# Patient Record
Sex: FEMALE | Race: ASIAN | NOT HISPANIC OR LATINO | URBAN - METROPOLITAN AREA
[De-identification: names, ages, dates, MRNs, and addresses within clinical notes are randomized per-mention and may not be internally consistent; named-entity substitution may affect disease eponyms.]

---

## 2024-02-27 ENCOUNTER — APPOINTMENT (RX ONLY)
Dept: URBAN - METROPOLITAN AREA CLINIC 159 | Facility: CLINIC | Age: 34
Setting detail: DERMATOLOGY
End: 2024-02-27

## 2024-02-27 DIAGNOSIS — Z41.9 ENCOUNTER FOR PROCEDURE FOR PURPOSES OTHER THAN REMEDYING HEALTH STATE, UNSPECIFIED: ICD-10-CM

## 2024-02-27 PROCEDURE — ? FACIAL

## 2024-02-27 ASSESSMENT — LOCATION ZONE DERM: LOCATION ZONE: LIP

## 2024-02-27 ASSESSMENT — LOCATION DETAILED DESCRIPTION DERM: LOCATION DETAILED: LEFT UPPER CUTANEOUS LIP

## 2024-02-27 ASSESSMENT — LOCATION SIMPLE DESCRIPTION DERM: LOCATION SIMPLE: LEFT LIP

## 2024-02-27 NOTE — PROCEDURE: FACIAL
Extraction Method: extractor
Price (Use Numbers Only, No Special Characters Or $): 125
Facial Steaming: steamed
Exfoliation Type: scrub
Treatment Type (Optional): Spa Facial
Detail Level: Zone
Treatment Type Override: back

## 2024-04-25 ENCOUNTER — APPOINTMENT (RX ONLY)
Dept: URBAN - METROPOLITAN AREA CLINIC 159 | Facility: CLINIC | Age: 34
Setting detail: DERMATOLOGY
End: 2024-04-25

## 2024-04-25 DIAGNOSIS — Z41.9 ENCOUNTER FOR PROCEDURE FOR PURPOSES OTHER THAN REMEDYING HEALTH STATE, UNSPECIFIED: ICD-10-CM

## 2024-04-25 PROCEDURE — ? ADDITIONAL NOTES

## 2024-04-25 PROCEDURE — ? DYSPORT

## 2024-04-25 ASSESSMENT — LOCATION SIMPLE DESCRIPTION DERM: LOCATION SIMPLE: LEFT CHEEK

## 2024-04-25 ASSESSMENT — LOCATION ZONE DERM: LOCATION ZONE: FACE

## 2024-04-25 ASSESSMENT — LOCATION DETAILED DESCRIPTION DERM: LOCATION DETAILED: LEFT INFERIOR CENTRAL MALAR CHEEK

## 2024-04-25 NOTE — PROCEDURE: DYSPORT
Consent: Written consent obtained. Risks include but not limited to lid/brow ptosis, bruising, swelling, diplopia, temporary effect, incomplete chemical denervation.
Periorbital Skin Units: 7.5
Show Ucl Units: No
Show Orbicularis Oculi Units: Yes
Masseter Units: 0
Post-Care Instructions: Patient instructed to not wear tight fitting hats or headbands, not to massage or manipulate injected areas, or lie down for at least 4 hours after procedure.  Limiting physical activity for 24 hours is also recommended.
Dilution (U/0.1 Cc): 5
Detail Level: Zone
Additional Area 1 Location: tail of brow
Glabellar Complex Units: 15
Additional Area 1 Units: 2.5
Additional Area 2 Location: Upper lip
Show Inventory Tab: Show

## 2024-04-25 NOTE — PROCEDURE: ADDITIONAL NOTES
Render Risk Assessment In Note?: no
Detail Level: Simple
Additional Notes: Rec filler to mid cheek region (HA filler)\\n\\nGood candidate for under eye PRP (series of 3, 4-6 wks apart), will improve discoloration and brighten.

## 2024-06-13 ENCOUNTER — APPOINTMENT (RX ONLY)
Dept: URBAN - METROPOLITAN AREA CLINIC 159 | Facility: CLINIC | Age: 34
Setting detail: DERMATOLOGY
End: 2024-06-13

## 2024-06-13 DIAGNOSIS — Z41.9 ENCOUNTER FOR PROCEDURE FOR PURPOSES OTHER THAN REMEDYING HEALTH STATE, UNSPECIFIED: ICD-10-CM

## 2024-06-13 PROCEDURE — ? PLATELET RICH PLASMA INJECTION

## 2024-06-13 ASSESSMENT — LOCATION ZONE DERM: LOCATION ZONE: EYELID

## 2024-06-13 ASSESSMENT — LOCATION DETAILED DESCRIPTION DERM
LOCATION DETAILED: LEFT LATERAL INFERIOR EYELID
LOCATION DETAILED: RIGHT LATERAL INFERIOR EYELID

## 2024-06-13 ASSESSMENT — LOCATION SIMPLE DESCRIPTION DERM
LOCATION SIMPLE: LEFT INFERIOR EYELID
LOCATION SIMPLE: RIGHT INFERIOR EYELID

## 2024-06-13 NOTE — PROCEDURE: PLATELET RICH PLASMA INJECTION
Venipuncture Paragraph: An alcohol pad was applied to the venipuncture site. Venipuncture was performed using a butterfly needle. Pressure and a bandaid was applied to the site. No complications were noted.
Who Performed The Venipuncture?: my medical assistant
Anesthesia Type: 1% lidocaine with epinephrine
Post-Care Instructions: After the procedure, take precautions against sun exposure.  Do not apply make-up for 12 hours after the procedure.  Do not swim or undertake any strenuous exercise to prevent bruising.  Use only gentle skincare products.  After 2-3 days patients can return to their regular skin regimen.
Detail Level: Simple
Amount Injected At This Location In Cc (Will Not Render If 0): 0
Amount Injected At This Location In Cc (Will Not Render If 0): 2.5
Location #1: Right lower periorbital
Consent: Written consent obtained. Risks include but not limited to procedural discomfort, bruising, swelling, infection, incomplete augmentation, and temporary nature of the procedure were discussed with the patient.  \\nPatient understands the procedure is cosmetic in nature and will require out of pocket payment.
Technique 2: PRP for hairloss:  \\n\\nPatients blood was obtained via venipuncture using aseptic technique into the PRP tube.  The tube was inverted 7 times to mix the anti-coagulant with patient blood.  Tube was placed in the centrifuge within 6 minutes of blood draw and spun for 10 minutes.  PRP tube was removed from centrifuge.  Keeping the tube vertical, a needle was inserted into the top of the tube and PRP was aspirated into a 3 ml syringe.\\n\\nPRP was injected via 27g or 30 g needle into the deep dermis and/or subcutaneous layer of the skin throughout areas of the scalp where thinning is clinically present.
Show Additional Techniques: Yes
Location #2: Left lower periorbital
Treatment Number (Optional): 1
Which Technique?: Default
Price (Use Numbers Only, No Special Characters Or $): 654
Standard Default Technique For Making Prp: PRP skin rejuvenation:\\n\\nPatients blood was obtained via venipuncture using aseptic technique into the PRP tube.  The tube was inverted 7 times to mix the anti-coagulant with patient blood.  Tube was placed in the centrifuge within 6 minutes of blood draw and spun for 10 minutes.  PRP tube was removed from centrifuge.  Keeping the tube vertical, a needle was inserted into the top of the tube and PRP was aspirated into a 3 ml syringe.\\n\\nPRP was injected into the dermal layer of the periorbital skin via 25 g 1 ½ in cannula.  2-3 ml were injected under each eye.

## 2024-07-12 ENCOUNTER — APPOINTMENT (RX ONLY)
Dept: URBAN - METROPOLITAN AREA CLINIC 159 | Facility: CLINIC | Age: 34
Setting detail: DERMATOLOGY
End: 2024-07-12

## 2024-07-12 DIAGNOSIS — Z41.9 ENCOUNTER FOR PROCEDURE FOR PURPOSES OTHER THAN REMEDYING HEALTH STATE, UNSPECIFIED: ICD-10-CM

## 2024-07-12 PROCEDURE — ? PLATELET RICH PLASMA INJECTION

## 2024-07-12 PROCEDURE — ? RECOMMENDATIONS

## 2024-07-12 ASSESSMENT — LOCATION SIMPLE DESCRIPTION DERM
LOCATION SIMPLE: LEFT INFERIOR EYELID
LOCATION SIMPLE: RIGHT INFERIOR EYELID

## 2024-07-12 ASSESSMENT — LOCATION DETAILED DESCRIPTION DERM
LOCATION DETAILED: LEFT LATERAL INFERIOR EYELID
LOCATION DETAILED: RIGHT LATERAL INFERIOR EYELID

## 2024-07-12 ASSESSMENT — LOCATION ZONE DERM: LOCATION ZONE: EYELID

## 2024-07-12 NOTE — PROCEDURE: PLATELET RICH PLASMA INJECTION
Venipuncture Paragraph: An alcohol pad was applied to the venipuncture site. Venipuncture was performed using a butterfly needle. Pressure and a bandaid was applied to the site. No complications were noted.
Who Performed The Venipuncture?: my medical assistant
Anesthesia Type: 1% lidocaine with epinephrine
Post-Care Instructions: After the procedure, take precautions against sun exposure.  Do not apply make-up for 12 hours after the procedure.  Do not swim or undertake any strenuous exercise to prevent bruising.  Use only gentle skincare products.  After 2-3 days patients can return to their regular skin regimen.
Detail Level: Simple
Amount Injected At This Location In Cc (Will Not Render If 0): 0
Amount Injected At This Location In Cc (Will Not Render If 0): 2.5
Location #1: Right lower periorbital
Consent: Written consent obtained. Risks include but not limited to procedural discomfort, bruising, swelling, infection, incomplete augmentation, and temporary nature of the procedure were discussed with the patient.  \\nPatient understands the procedure is cosmetic in nature and will require out of pocket payment.
Technique 2: PRP for hairloss:  \\n\\nPatients blood was obtained via venipuncture using aseptic technique into the PRP tube.  The tube was inverted 7 times to mix the anti-coagulant with patient blood.  Tube was placed in the centrifuge within 6 minutes of blood draw and spun for 10 minutes.  PRP tube was removed from centrifuge.  Keeping the tube vertical, a needle was inserted into the top of the tube and PRP was aspirated into a 3 ml syringe.\\n\\nPRP was injected via 27g or 30 g needle into the deep dermis and/or subcutaneous layer of the skin throughout areas of the scalp where thinning is clinically present.
Show Additional Techniques: Yes
Location #2: Left lower periorbital
Treatment Number (Optional): 2
Which Technique?: Default
Price (Use Numbers Only, No Special Characters Or $): 707
Standard Default Technique For Making Prp: PRP skin rejuvenation:\\n\\nPatients blood was obtained via venipuncture using aseptic technique into the PRP tube.  The tube was inverted 7 times to mix the anti-coagulant with patient blood.  Tube was placed in the centrifuge within 6 minutes of blood draw and spun for 10 minutes.  PRP tube was removed from centrifuge.  Keeping the tube vertical, a needle was inserted into the top of the tube and PRP was aspirated into a 3 ml syringe.\\n\\nPRP was injected into the dermal layer of the periorbital skin via 25 g 1 ½ in cannula.  2-3 ml were injected under each eye.

## 2024-07-12 NOTE — PROCEDURE: RECOMMENDATIONS
Render Risk Assessment In Note?: no
Recommendations (Free Text): Hydrinity eyecream bid
Recommendation Preamble: The following recommendations were made during the visit:
Detail Level: Zone

## 2024-08-09 ENCOUNTER — APPOINTMENT (RX ONLY)
Dept: URBAN - METROPOLITAN AREA CLINIC 159 | Facility: CLINIC | Age: 34
Setting detail: DERMATOLOGY
End: 2024-08-09

## 2024-08-09 DIAGNOSIS — Z41.9 ENCOUNTER FOR PROCEDURE FOR PURPOSES OTHER THAN REMEDYING HEALTH STATE, UNSPECIFIED: ICD-10-CM

## 2024-08-09 PROCEDURE — ? PLATELET RICH PLASMA INJECTION

## 2024-08-09 PROCEDURE — ? RECOMMENDATIONS

## 2024-08-09 ASSESSMENT — LOCATION SIMPLE DESCRIPTION DERM
LOCATION SIMPLE: RIGHT INFERIOR EYELID
LOCATION SIMPLE: LEFT INFERIOR EYELID

## 2024-08-09 ASSESSMENT — LOCATION DETAILED DESCRIPTION DERM
LOCATION DETAILED: LEFT LATERAL INFERIOR EYELID
LOCATION DETAILED: RIGHT LATERAL INFERIOR EYELID

## 2024-08-09 ASSESSMENT — LOCATION ZONE DERM: LOCATION ZONE: EYELID

## 2024-08-09 NOTE — PROCEDURE: RECOMMENDATIONS
Render Risk Assessment In Note?: no
Recommendations (Free Text): Hydrinity eyecream bid
Recommendation Preamble: The following recommendations were made during the visit:
Detail Level: Zone
[Initial Evaluation] : an initial evaluation of
[Asthma/RAD] : asthma/RAD
[GERD] : GERD
[Sleep Apnea] : sleep apnea
[Patient] : patient
[Father] : father
[Medical Records] : medical records

## 2024-08-09 NOTE — PROCEDURE: PLATELET RICH PLASMA INJECTION
Venipuncture Paragraph: An alcohol pad was applied to the venipuncture site. Venipuncture was performed using a butterfly needle. Pressure and a bandaid was applied to the site. No complications were noted.
Who Performed The Venipuncture?: my medical assistant
Anesthesia Type: 1% lidocaine with epinephrine
Post-Care Instructions: After the procedure, take precautions against sun exposure.  Do not apply make-up for 12 hours after the procedure.  Do not swim or undertake any strenuous exercise to prevent bruising.  Use only gentle skincare products.  After 2-3 days patients can return to their regular skin regimen.
Detail Level: Simple
Amount Injected At This Location In Cc (Will Not Render If 0): 0
Amount Injected At This Location In Cc (Will Not Render If 0): 2.5
Location #1: Right lower periorbital
Consent: Written consent obtained. Risks include but not limited to procedural discomfort, bruising, swelling, infection, incomplete augmentation, and temporary nature of the procedure were discussed with the patient.  \\nPatient understands the procedure is cosmetic in nature and will require out of pocket payment.
Technique 2: PRP for hairloss:  \\n\\nPatients blood was obtained via venipuncture using aseptic technique into the PRP tube.  The tube was inverted 7 times to mix the anti-coagulant with patient blood.  Tube was placed in the centrifuge within 6 minutes of blood draw and spun for 10 minutes.  PRP tube was removed from centrifuge.  Keeping the tube vertical, a needle was inserted into the top of the tube and PRP was aspirated into a 3 ml syringe.\\n\\nPRP was injected via 27g or 30 g needle into the deep dermis and/or subcutaneous layer of the skin throughout areas of the scalp where thinning is clinically present.
Show Additional Techniques: Yes
Location #2: Left lower periorbital
Treatment Number (Optional): 3
Which Technique?: Default
Price (Use Numbers Only, No Special Characters Or $): 103
Standard Default Technique For Making Prp: PRP skin rejuvenation:\\n\\nPatients blood was obtained via venipuncture using aseptic technique into the PRP tube.  The tube was inverted 7 times to mix the anti-coagulant with patient blood.  Tube was placed in the centrifuge within 6 minutes of blood draw and spun for 10 minutes.  PRP tube was removed from centrifuge.  Keeping the tube vertical, a needle was inserted into the top of the tube and PRP was aspirated into a 3 ml syringe.\\n\\nPRP was injected into the dermal layer of the periorbital skin via 25 g 1 ½ in cannula.  2-3 ml were injected under each eye.

## 2024-08-20 ENCOUNTER — APPOINTMENT (RX ONLY)
Dept: URBAN - METROPOLITAN AREA CLINIC 159 | Facility: CLINIC | Age: 34
Setting detail: DERMATOLOGY
End: 2024-08-20

## 2024-08-20 DIAGNOSIS — Z41.9 ENCOUNTER FOR PROCEDURE FOR PURPOSES OTHER THAN REMEDYING HEALTH STATE, UNSPECIFIED: ICD-10-CM

## 2024-08-20 PROCEDURE — ? RECOMMENDATIONS

## 2024-08-20 PROCEDURE — ? FACIAL

## 2024-08-20 ASSESSMENT — LOCATION SIMPLE DESCRIPTION DERM: LOCATION SIMPLE: RIGHT CHEEK

## 2024-08-20 ASSESSMENT — LOCATION ZONE DERM: LOCATION ZONE: FACE

## 2024-08-20 ASSESSMENT — LOCATION DETAILED DESCRIPTION DERM: LOCATION DETAILED: RIGHT INFERIOR CENTRAL MALAR CHEEK

## 2024-08-20 NOTE — PROCEDURE: FACIAL
Facial Steaming: steamed
Exfoliation Type: scrub
Treatment Type (Optional): Anti Aging Facial
Treatment Serum (Optional): Vitamin C
Detail Level: Zone
Comments (Non-Sticky): Alastin gentle cleansed, VI purifying cleanser, SC glycolic/Lactic acid gel pel x 3-5mins - neutralize and remove, Hyacyn mist, extractions to nose. Small resolving cyst on left lower cheek. Clarifying mask onnose amd left lower cheek, SM calming mask to remaining face. \\n\\n-Skin hydrated with glow and decreased congestion on nose.  \\n\\n-Discussed/rec mystro to improve redness on cheeks, nose and medial forehead and to balance breakouts around menses.\\n\\n-RecHyacyn mist and SB peels pads now 2-3x/wkly. RTC X 1mth for facial with dermaplane
Mask Type (Optional): purifying
Price (Use Numbers Only, No Special Characters Or $): 125

## 2024-08-20 NOTE — PROCEDURE: RECOMMENDATIONS
Recommendations (Free Text): Hydrinity hyacyn, [employer]'s peel pads
Recommendation Preamble: The following recommendations were made during the visit:
Render Risk Assessment In Note?: no
Detail Level: Zone

## 2024-08-20 NOTE — HPI: COSMETIC (FACIAL)
previous_has_had_a_previous_facial
When Outside In The Sun, Do You...: rarely burns, mostly tans
When Was Your Last Facial?: \"Months ago\"
Additional History: NKDA, not currently pregnant or breast feeding; will start trying to become pregnant next month per pt